# Patient Record
Sex: MALE | Race: WHITE | NOT HISPANIC OR LATINO | URBAN - METROPOLITAN AREA
[De-identification: names, ages, dates, MRNs, and addresses within clinical notes are randomized per-mention and may not be internally consistent; named-entity substitution may affect disease eponyms.]

---
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
## 2020-12-29 ENCOUNTER — EMERGENCY (EMERGENCY)
Facility: HOSPITAL | Age: 20
LOS: 0 days | Discharge: HOME | End: 2020-12-29
Attending: HOSPITALIST
Payer: COMMERCIAL

## 2020-12-29 VITALS
RESPIRATION RATE: 17 BRPM | WEIGHT: 195.11 LBS | TEMPERATURE: 98 F | HEIGHT: 74 IN | OXYGEN SATURATION: 100 % | SYSTOLIC BLOOD PRESSURE: 113 MMHG | DIASTOLIC BLOOD PRESSURE: 79 MMHG | HEART RATE: 71 BPM

## 2020-12-29 DIAGNOSIS — S61.213A LACERATION WITHOUT FOREIGN BODY OF LEFT MIDDLE FINGER WITHOUT DAMAGE TO NAIL, INITIAL ENCOUNTER: ICD-10-CM

## 2020-12-29 DIAGNOSIS — S61.215A LACERATION WITHOUT FOREIGN BODY OF LEFT RING FINGER WITHOUT DAMAGE TO NAIL, INITIAL ENCOUNTER: ICD-10-CM

## 2020-12-29 DIAGNOSIS — S61.411A LACERATION WITHOUT FOREIGN BODY OF RIGHT HAND, INITIAL ENCOUNTER: ICD-10-CM
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
## 2020-12-29 PROCEDURE — 12001 RPR S/N/AX/GEN/TRNK 2.5CM/<: CPT

## 2020-12-29 PROCEDURE — 99283 EMERGENCY DEPT VISIT LOW MDM: CPT | Mod: 25

## 2020-12-29 PROCEDURE — 99283 EMERGENCY DEPT VISIT LOW MDM: CPT

## 2020-12-29 RX ADMIN — Medication 1 TABLET(S): at 22:23

## 2020-12-29 NOTE — ED STATDOCS - NS_ ATTENDINGSCRIBEDETAILS _ED_A_ED_FT
Chloé Scott MD: The history, relevant review of systems, past medical and surgical history, medical decision making, and physical examination was documented by the scribe in my presence and I attest to the accuracy of the documentation.

## 2020-12-29 NOTE — ED STATDOCS - NSFOLLOWUPINSTRUCTIONS_ED_ALL_ED_FT
Animal Bite, Adult    Animal bites range from mild to serious. An animal bite can result in any of these injuries:  •A scratch.      •A deep, open cut.      •A puncture of the skin.      •A crush injury.      •Tearing away of the skin or a body part.      •A bone injury.      A small bite from a house pet is usually less serious than a bite from a stray or wild animal, such as a raccoon, cerna, skunk, or bat. That is because stray and wild animals have a higher risk of carrying a serious infection called rabies, which can be passed to humans through a bite.      What increases the risk?  You are more likely to be bitten by an animal if:  •You are around unfamiliar pets.      •You disturb an animal when it is eating, sleeping, or caring for its babies.      •You are outdoors in a place where small, wild animals roam freely.        What are the signs or symptoms?  Common symptoms of an animal bite include:  •Pain.      •Bleeding.      •Swelling.      •Bruising.        How is this diagnosed?  This condition may be diagnosed based on a physical exam and medical history. Your health care provider will examine your wound and ask for details about the animal and how the bite happened. You may also have tests, such as:  •Blood tests to check for infection.      •X-rays to check for damage to bones or joints.      •Taking a fluid sample from your wound and checking it for infection (culture test).        How is this treated?  Treatment varies depending on the type of animal, where the bite is on your body, and your medical history. Treatment may include:  •Caring for the wound. This often includes cleaning the wound, rinsing out (flushing) the wound with saline solution, and applying a bandage (dressing). In some cases, the wound may be closed with stitches (sutures), staples, skin glue, or adhesive strips.      •Antibiotic medicine to prevent or treat infection. This medicine may be prescribed in pill or ointment form. If the bite area becomes infected, the medicine may be given through an IV.      •A tetanus shot to prevent tetanus infection.      •Rabies treatment to prevent rabies infection. This will be done if the animal could have rabies.      •Surgery. This may be done if a bite gets infected or if there is damage that needs to be repaired.        Follow these instructions at home:      Wound care    •Follow instructions from your health care provider about how to take care of your wound. Make sure you:  •Wash your hands with soap and water before you change your dressing. If soap and water are not available, use hand .      •Change your dressing as told by your health care provider.      •Leave sutures, skin glue, or adhesive strips in place. These skin closures may need to stay in place for 2 weeks or longer. If adhesive strip edges start to loosen and curl up, you may trim the loose edges. Do not remove adhesive strips completely unless your health care provider tells you to do that.      •Check your wound every day for signs of infection. Check for:  •More redness, swelling, or pain.      •More fluid or blood.      •Warmth.      •Pus or a bad smell.        Medicines     •Take or apply over-the-counter and prescription medicines only as told by your health care provider.      •If you were prescribed an antibiotic, take or apply it as told by your health care provider. Do not stop using the antibiotic even if your condition improves.        General instructions      •Keep the injured area raised (elevated) above the level of your heart while you are sitting or lying down, if this is possible.    •If directed, put ice on the injured area.  •Put ice in a plastic bag.      •Place a towel between your skin and the bag.      •Leave the ice on for 20 minutes, 2–3 times per day.        •Keep all follow-up visits as told by your health care provider. This is important.        Contact a health care provider if:    •You have more redness, swelling, or pain around your wound.      •Your wound feels warm to the touch.      •You have a fever or chills.      •You have a general feeling of sickness (malaise).      •You feel nauseous or you vomit.      •You have pain that does not get better.        Get help right away if:    •You have a red streak that leads away from your wound.      •You have non-clear fluid or more blood coming from your wound.      •There is pus or a bad smell coming from your wound.      •You have trouble moving your injured area.      •You have numbness or tingling that extends beyond the wound.        Summary    •Animal bites can range from mild to serious. An animal bite can cause a scratch on the skin, a deep open cut, a puncture of the skin, a crush injury, tearing away of the skin or a body part, or a bone injury.      •Your health care provider will examine your wound and ask for details about the animal and how the bite happened.      •You may also have tests such as a blood test, X-ray, or testing of a fluid sample from your wound (culture test).      •Treatment may include wound care, antibiotic medicine, a tetanus shot, and rabies treatment if the animal could have rabies.      This information is not intended to replace advice given to you by your health care provider. Make sure you discuss any questions you have with your health care provider.    Rest. Elevate affected hands.   Wash left hand daily with soap and water.  Right Hand change dressing daily. Clean with water then apply new bandage.  Follow up with PMD for suture removal in 7-10 days.

## 2020-12-29 NOTE — ED ADULT NURSE NOTE - NSIMPLEMENTINTERV_GEN_ALL_ED
Implemented All Universal Safety Interventions:  [place] to call system. Call bell, personal items and telephone within reach. Instruct patient to call for assistance. Room bathroom lighting operational. Non-slip footwear when patient is off stretcher. Physically safe environment: no spills, clutter or unnecessary equipment. Stretcher in lowest position, wheels locked, appropriate side rails in place.

## 2020-12-29 NOTE — ED STATDOCS - OBJECTIVE STATEMENT
21 y/o male with no pertinent PMHx presents to the ED s/p dog bite. Pt presents with wounds to b/l hands; states the dog was waking up when event occurred. Pt is right hand dominant. Dog's vaccinations are UTD, tetanus UTD. NKDA.

## 2020-12-29 NOTE — ED STATDOCS - CLINICAL SUMMARY MEDICAL DECISION MAKING FREE TEXT BOX
21 y/o male s/p dog bite, will cleanse extensive, wound repair to right hand, follow up with outpatient physician.

## 2020-12-29 NOTE — ED ADULT NURSE REASSESSMENT NOTE - NS ED NURSE REASSESS COMMENT FT1
Pt lac sutured by OLIVIA Vasquez. Pt tolerated well, dsg to hand dry and intact, fingers warm and active with + sensation, pt denies any numbness or tingling to hand. Medicated with po antibiotic. pt in NAD.

## 2020-12-29 NOTE — ED ADULT TRIAGE NOTE - CHIEF COMPLAINT QUOTE
dog bite patient on L 3rd and 4th fingers. puncture wound on R hand. NKA. tetanus shot up to date. dogs vaccines are up to date.

## 2020-12-29 NOTE — ED STATDOCS - SKIN, MLM
skin normal color for race, warm, dry and intact. ++1cm laceration to dorsum of right hand between 1sdt and second digit, superficial laceration to 3rd and 4th digits on left hand

## 2020-12-29 NOTE — ED STATDOCS - PATIENT PORTAL LINK FT
You can access the FollowMyHealth Patient Portal offered by [facility] by registering at the following website: [email or web address]. By joining [facility]’s FollowMyHealth portal, you will also be able to view your health information using other applications (apps) compatible with our system.

## 2020-12-29 NOTE — ED STATDOCS - PROGRESS NOTE DETAILS
See procedure note. MTamarleni BAKER 20 yr. old male PMH:x presents to ED with Dog bite and scratches to right and left hands. Reports dog awoke and bite him. Dogs Vacinations UTD.  Patients last t-dap UTD. Seen and examined by attending in intake. Plan: Irrigated dog bite and scatches. Close right index finger  with 1 suture then apply bandage. Will F/U with patient. Sandra BAKER

## 2020-12-29 NOTE — ED STATDOCS - CARE PROVIDER_API CALL
Kar Dominguez  INTERNAL MEDICINE  [address], [address]  [place] [address]  Phone: [phone]  Fax: [phone]  Follow Up Time:

## 2021-01-15 ENCOUNTER — OUTPATIENT (OUTPATIENT)
Dept: OUTPATIENT SERVICES | Facility: HOSPITAL | Age: 21
LOS: 1 days | End: 2021-01-15
Payer: COMMERCIAL

## 2021-01-15 DIAGNOSIS — Z20.828 CONTACT WITH AND (SUSPECTED) EXPOSURE TO OTHER VIRAL COMMUNICABLE DISEASES: ICD-10-CM

## 2021-01-15 PROCEDURE — C9803: CPT

## 2021-01-15 PROCEDURE — U0003: CPT

## 2021-01-15 PROCEDURE — U0005: CPT

## 2021-01-16 DIAGNOSIS — Z20.828 CONTACT WITH AND (SUSPECTED) EXPOSURE TO OTHER VIRAL COMMUNICABLE DISEASES: ICD-10-CM

## 2021-01-16 LAB — SARS-COV-2 RNA SPEC QL NAA+PROBE: SIGNIFICANT CHANGE UP

## 2022-04-19 NOTE — ED PROVIDER NOTE - GASTROINTESTINAL NEGATIVE STATEMENT, MLM
Colonoscopy   WHAT YOU NEED TO KNOW:   A colonoscopy is a procedure to examine the inside of your colon (intestine) with a scope  Polyps or tissue growths may have been removed during your colonoscopy  It is normal to feel bloated and to have some abdominal discomfort  You should be passing gas  If you have hemorrhoids or you had polyps removed, you may have a small amount of bleeding  DISCHARGE INSTRUCTIONS:   Seek care immediately if:    You have sudden, severe abdominal pain   You have problems swallowing   You have a large amount of black, sticky bowel movements or blood in your bowel movements   You have sudden trouble breathing   You feel weak, lightheaded, or faint or your heart beats faster than normal for you  Contact your healthcare provider if:    You have a fever and chills   You have nausea or are vomiting   Your abdomen is bloated or feels full and hard   You have abdominal pain   You have black, sticky bowel movements or blood in your bowel movements   You have not had a bowel movement for 3 days after your procedure   You have rash or hives   You have questions or concerns about your procedure  Activity:    Do not lift, strain, or run for 24 hours after your procedure   Rest after your procedure  You have been given medicine to relax you  Do not drive or make important decisions until the day after your procedure  Return to your normal activity as directed   Relieve gas and discomfort from bloating by lying on your right side with a heating pad on your abdomen  You may need to take short walks to help the gas move out  Eat small meals until bloating is relieved  Follow up with your healthcare provider as directed: Write down your questions so you remember to ask them during your visits  If you take a blood thinner, please review the specific instructions from your endoscopist about when you should resume it   These can be found in the Recommendation and Your Medication list sections of this After Visit Summary  no abdominal pain, no bloating, no constipation, no diarrhea, no nausea and no vomiting.

## 2025-04-25 NOTE — ED PROVIDER NOTE - CROS ED RESP ALL NEG
Patient was provided verbal instructions regarding right heart cath procedure scheduled on Friday, April 25, 2025 with individual medication/s reviewed per MD's office instructions.  Patient's friend, Leti, will be the responsible ride & post discharge caretaker.       She was instructed RE: arrival time, department location/parking, Covid/lab test, visitor policy, leaving valuables at home, bring a photo ID, insurance card/s, a list of current medications.  She verbalized understanding of above.    Patient does not want to take any meds DOS, see MAR for other med holding restrictions prior to procedure.    
Pt discharged home with family. Left in stable condition with all belongings. RN reviewed AVS with pt and she V/U of discharge instructions.     
negative...

## 2025-05-20 NOTE — ED STATDOCS - CARE PLAN
PRE-SEDATION ASSESSMENT    CONSENT  Risks, benefits, and alternatives have been discussed with the patient/patient representative, and patient/patient representative agrees to proceed: Yes    MEDICAL HISTORY  Significant medical/surgical history: No  Past Complications with Sedation/Anesthesia: No  Significant Family History: No  Smoking History: No  Alcohol/Drug abuse: No  Possible Pregnancy (LMP): No  Cardiac History: No  Respiratory History: No    PHYSICAL EXAM  History and Physical Reviewed: H&P completed today  H&P Updates: lumbar facet pain  Airway Risk History: No previous history  Airway Anatomy : Class II  Heart : Normal  Lungs : Normal  LOC/Mental Status : Normal    OTHER FINDINGS  Reviewed current medications and allergies: Yes    SEDATION RISK ASSESSMENT  Risk Status ASA: Class III - Severe systemic disease, limits activity, is not incapacitated  Plan for Sedation: Moderate Sedation    NARRATIVE FINDINGS      Principal Discharge DX:	Dog bite of hand